# Patient Record
(demographics unavailable — no encounter records)

---

## 2024-10-16 NOTE — REASON FOR VISIT
[Annual] : an annual visit. [TextEntry] : ANNUAL EXAM.  C/O DIFFICULTY VOIDING.  URINATING FREQUENTLY AND SMALL AMTS.  FREQUENT UTIS IN PAST BUT BETTER OVER PAST YR.  OCCAS BURNING W URINATION.   USES LUVENA AT TIMES W PARTIAL IMPROVEMENT.  DID NOT START VAGINA  ERT.  OCCAS SEES DARK COLORED DISCHARGE ON UNDERWEAR STOPPED FOSAMAX LAST YR AFTER SEVERAL YRS OF RX

## 2024-10-18 NOTE — PHYSICAL EXAM
[de-identified] : + eczema [Normal] : mucosa is normal [Midline] : trachea located in midline position

## 2024-10-18 NOTE — ASSESSMENT
[FreeTextEntry1] : Eczema:  - mostly external ear but slight continuation into external meatus - triamcinolone +/- mometasone  cerumen impaction:  - cerumen removal performed today - f/up 6months to 1 year   Thyroid nodule:  - previous TI-RADS 3 isthmus nodule s/p neg FNA - last US 1 year ago unchanged - repeat US ordered, will call with results

## 2024-10-18 NOTE — CONSULT LETTER
[Dear  ___] : Dear  [unfilled], [Consult Letter:] : I had the pleasure of evaluating your patient, [unfilled]. [Please see my note below.] : Please see my note below. [Consult Closing:] : Thank you very much for allowing me to participate in the care of this patient.  If you have any questions, please do not hesitate to contact me. [FreeTextEntry3] : Sincerely,   Val Strange MD Otolaryngology- Facial Plastics  [address] [place] [id number] (P) - [phone] (F) - [phone]

## 2024-10-18 NOTE — CONSULT LETTER
[Dear  ___] : Dear  [unfilled], [Consult Letter:] : I had the pleasure of evaluating your patient, [unfilled]. [Please see my note below.] : Please see my note below. [Consult Closing:] : Thank you very much for allowing me to participate in the care of this patient.  If you have any questions, please do not hesitate to contact me. [FreeTextEntry3] : Sincerely,   Val Strange MD Otolaryngology- Facial Plastics  [address] [place] [id number] (P) - [phone] (F) - [phone]

## 2024-10-18 NOTE — REASON FOR VISIT
[Subsequent Evaluation] : a subsequent evaluation for [Epistaxis] : epistaxis [FreeTextEntry2] : ear clogging

## 2024-10-18 NOTE — HISTORY OF PRESENT ILLNESS
[de-identified] : prior cerumen removal with Dr. Amaya in 2018 - cautery L ant septum 8/30/23 and cerumen removal. Reports bilateral ear fullness.  Denies otalgia, otorrhea, ear infections, hearing loss, tinnitus, dizziness, vertigo, headaches related to hearing.  Reports history of thyroid nodule- s/p FNA 2 years ago with PCP was told was normal- was told by PCP to follow up with ENT Patient's blood pressure was 170/78. Patient was advised to follow up with PCP for high blood pressure.

## 2024-10-18 NOTE — PHYSICAL EXAM
[de-identified] : + eczema [Normal] : mucosa is normal [Midline] : trachea located in midline position

## 2024-10-18 NOTE — HISTORY OF PRESENT ILLNESS
[de-identified] : prior cerumen removal with Dr. Amaya in 2018 - cautery L ant septum 8/30/23 and cerumen removal. Reports bilateral ear fullness.  Denies otalgia, otorrhea, ear infections, hearing loss, tinnitus, dizziness, vertigo, headaches related to hearing.  Reports history of thyroid nodule- s/p FNA 2 years ago with PCP was told was normal- was told by PCP to follow up with ENT Patient's blood pressure was 170/78. Patient was advised to follow up with PCP for high blood pressure.